# Patient Record
Sex: MALE | Race: BLACK OR AFRICAN AMERICAN | NOT HISPANIC OR LATINO | Employment: PART TIME | URBAN - METROPOLITAN AREA
[De-identification: names, ages, dates, MRNs, and addresses within clinical notes are randomized per-mention and may not be internally consistent; named-entity substitution may affect disease eponyms.]

---

## 2024-05-24 ENCOUNTER — OFFICE VISIT (OUTPATIENT)
Dept: URGENT CARE | Facility: URGENT CARE | Age: 24
End: 2024-05-24
Payer: COMMERCIAL

## 2024-05-24 ENCOUNTER — ANCILLARY PROCEDURE (OUTPATIENT)
Dept: GENERAL RADIOLOGY | Facility: CLINIC | Age: 24
End: 2024-05-24
Attending: PHYSICIAN ASSISTANT
Payer: COMMERCIAL

## 2024-05-24 VITALS
RESPIRATION RATE: 18 BRPM | SYSTOLIC BLOOD PRESSURE: 114 MMHG | TEMPERATURE: 98.3 F | WEIGHT: 259 LBS | DIASTOLIC BLOOD PRESSURE: 55 MMHG | HEART RATE: 63 BPM | OXYGEN SATURATION: 98 %

## 2024-05-24 DIAGNOSIS — M79.671 RIGHT FOOT PAIN: ICD-10-CM

## 2024-05-24 DIAGNOSIS — M79.671 RIGHT FOOT PAIN: Primary | ICD-10-CM

## 2024-05-24 PROCEDURE — 99203 OFFICE O/P NEW LOW 30 MIN: CPT | Performed by: PHYSICIAN ASSISTANT

## 2024-05-24 PROCEDURE — 73630 X-RAY EXAM OF FOOT: CPT | Mod: TC | Performed by: RADIOLOGY

## 2024-05-24 RX ORDER — NAPROXEN 500 MG/1
500 TABLET ORAL 2 TIMES DAILY WITH MEALS
Qty: 20 TABLET | Refills: 0 | Status: SHIPPED | OUTPATIENT
Start: 2024-05-24

## 2024-05-24 ASSESSMENT — PAIN SCALES - GENERAL: PAINLEVEL: EXTREME PAIN (8)

## 2024-05-24 NOTE — LETTER
May 24, 2024      Zoey Engle Casimiro  [address] [facility] [address]        To Whom It May Concern:    Zoey Paigeens Kirkpatrick  was seen on 5/24/2024.  Please excuse him from work 5/25/2024.        Sincerely,        Jackelin Regan PA-C

## 2024-05-24 NOTE — PROGRESS NOTES
Chief Complaint   Patient presents with    Pain     Right foot started Wednesday hurts when put pressure on it, possible swelling, no injury that aware of      X-ray-I see no obvious fracture    Results for orders placed or performed in visit on 05/24/24   XR Foot Right G/E 3 Views     Status: None    Narrative    XR FOOT RIGHT G/E 3 VIEWS   5/24/2024 12:52 PM     HISTORY:  mainly 5th mtp; Right foot pain      Impression    IMPRESSION:  Negative exam.    NAEEM CALLEJAS MD         SYSTEM ID:  [id number]           ASSESSMENT:    ICD-10-CM    1. Right foot pain  M79.671 XR Foot Right G/E 3 Views            PLAN: Right foot sprain/strain/contusion.  Ice, elevate.  Naproxen.  Take with food, discontinue if GI upset.  No history of ulcers, kidney, liver problems.  Follow-up podiatry 2 weeks if no improvement.    Good support shoes.  Ace wrap as needed.  Ice, elevate.     Jackelin Regan PA-C        SUBJECTIVE:  Zoey Kirkpatrick is an 24 year old male who presents with right foot pain that started 3 days ago.  He denies any injury.  No numbness or tingling.  Hurts if he puts pressure on it or walks on it.  No personal history of diabetes.  Mom and dad with diabetes.  Maternal great grandfather with gout.    No past medical history on file.  History   Smoking Status    Never   Smokeless Tobacco    Never       ROS:  GEN no fevers  SKIN no erythema  Musculoskeletal:  See HPI.      OBJECTIVE:  Blood pressure 114/55, pulse 63, temperature 98.3  F (36.8  C), temperature source Oral, resp. rate 18, weight 117.5 kg (259 lb), SpO2 98%.  Patient is alert and NAD.  EYES: conjunctiva clear  Ankle/foot Exam (right):  Inspection/palpation: No swelling or redness noted.  Ankle is nontender to palpation with full range of motion.  Localized tenderness fifth MTP area mainly.  Cap refill intact.    Good doralis pedis.  Neurovascularly Intact Distally.         Jackelin Regan PA-C